# Patient Record
Sex: FEMALE | Race: WHITE | NOT HISPANIC OR LATINO | Employment: UNEMPLOYED | URBAN - METROPOLITAN AREA
[De-identification: names, ages, dates, MRNs, and addresses within clinical notes are randomized per-mention and may not be internally consistent; named-entity substitution may affect disease eponyms.]

---

## 2018-06-11 ENCOUNTER — OFFICE VISIT (OUTPATIENT)
Dept: INTERNAL MEDICINE | Facility: CLINIC | Age: 38
End: 2018-06-11
Payer: COMMERCIAL

## 2018-06-11 VITALS
HEIGHT: 62 IN | SYSTOLIC BLOOD PRESSURE: 94 MMHG | HEART RATE: 60 BPM | WEIGHT: 101 LBS | BODY MASS INDEX: 18.58 KG/M2 | TEMPERATURE: 98 F | DIASTOLIC BLOOD PRESSURE: 62 MMHG

## 2018-06-11 DIAGNOSIS — R19.05 PERIUMBILICAL MASS: Primary | ICD-10-CM

## 2018-06-11 PROCEDURE — 99999 PR PBB SHADOW E&M-NEW PATIENT-LVL III: CPT | Mod: PBBFAC,,, | Performed by: PHYSICIAN ASSISTANT

## 2018-06-11 PROCEDURE — 99203 OFFICE O/P NEW LOW 30 MIN: CPT | Mod: S$GLB,,, | Performed by: PHYSICIAN ASSISTANT

## 2018-06-11 NOTE — PROGRESS NOTES
"Subjective:       Patient ID: Mili Daly is a 38 y.o. female.    Chief Complaint: Mass (stomach)    Patient comes in today for evaluation of bump on stomach, under umbilicus. She is a 37 yo healthy individual. No known medical issues, on no medications  Has been there for several months, but has been doing ab workouts and noticing it more and it is actually starting to hurt  Not getting any bigger.   Normal BMs, normal urination   No other complaints today.     Has had pregnancy x2. No abdominal surgery         There are no preventive care reminders to display for this patient.    History reviewed. No pertinent past medical history.    No current outpatient prescriptions on file.     No current facility-administered medications for this visit.        Review of Systems   Constitutional: Negative for activity change, appetite change, chills, fever and unexpected weight change.   HENT: Negative for trouble swallowing and voice change.    Eyes: Negative for photophobia and visual disturbance.   Respiratory: Negative for apnea and choking.    Cardiovascular: Negative for chest pain, palpitations and leg swelling.   Gastrointestinal: Negative for abdominal distention, abdominal pain and anal bleeding.   Endocrine: Negative for cold intolerance and heat intolerance.   Genitourinary: Negative for difficulty urinating, dyspareunia, menstrual problem and pelvic pain.   Musculoskeletal: Negative for arthralgias and back pain.   Skin: Negative for rash and wound.   Allergic/Immunologic: Negative for immunocompromised state.   Neurological: Negative for dizziness, syncope and weakness.   Hematological: Negative for adenopathy. Does not bruise/bleed easily.   Psychiatric/Behavioral: Negative for sleep disturbance and suicidal ideas.       Objective:   BP 94/62   Pulse 60   Temp 97.7 °F (36.5 °C) (Tympanic)   Ht 5' 2" (1.575 m)   Wt 45.8 kg (100 lb 15.5 oz)   BMI 18.47 kg/m²      Physical Exam   Constitutional: She " appears well-developed and well-nourished. No distress.   HENT:   Head: Normocephalic and atraumatic.   Right Ear: External ear normal.   Left Ear: External ear normal.   Abdominal: Soft. Normal appearance and bowel sounds are normal.             No results found for: WBC, HGB, HCT, PLT, CHOL, TRIG, HDL, LDLDIRECT, ALT, AST, NA, K, CL, CREATININE, BUN, CO2, TSH, PSA, INR, GLUF, HGBA1C, MICROALBUR    Assessment:       1. Periumbilical mass        Plan:   Periumbilical mass  -     US Abdomen Limited; Future; Expected date: 06/11/2018    evaluation with US   Will follow up with results     No Follow-up on file.

## 2018-06-20 ENCOUNTER — TELEPHONE (OUTPATIENT)
Dept: RADIOLOGY | Facility: HOSPITAL | Age: 38
End: 2018-06-20

## 2018-06-21 ENCOUNTER — HOSPITAL ENCOUNTER (OUTPATIENT)
Dept: RADIOLOGY | Facility: HOSPITAL | Age: 38
Discharge: HOME | End: 2018-06-21
Attending: PHYSICIAN ASSISTANT
Payer: COMMERCIAL

## 2018-06-21 DIAGNOSIS — R19.05 PERIUMBILICAL MASS: ICD-10-CM

## 2018-06-21 PROCEDURE — 76705 ECHO EXAM OF ABDOMEN: CPT | Mod: 26,,, | Performed by: RADIOLOGY

## 2018-06-21 PROCEDURE — 76705 ECHO EXAM OF ABDOMEN: CPT | Mod: TC,PO
